# Patient Record
Sex: FEMALE | Race: WHITE | Employment: FULL TIME | URBAN - METROPOLITAN AREA
[De-identification: names, ages, dates, MRNs, and addresses within clinical notes are randomized per-mention and may not be internally consistent; named-entity substitution may affect disease eponyms.]

---

## 2020-07-06 ENCOUNTER — HOSPITAL ENCOUNTER (EMERGENCY)
Age: 25
Discharge: HOME | End: 2020-07-06
Attending: EMERGENCY MEDICINE
Payer: COMMERCIAL

## 2020-07-06 VITALS
OXYGEN SATURATION: 100 % | TEMPERATURE: 97.1 F | BODY MASS INDEX: 2.76 KG/M2 | SYSTOLIC BLOOD PRESSURE: 112 MMHG | HEIGHT: 62 IN | DIASTOLIC BLOOD PRESSURE: 70 MMHG | WEIGHT: 15 LBS | HEART RATE: 75 BPM | RESPIRATION RATE: 16 BRPM

## 2020-07-06 LAB
ANION GAP SERPL CALCULATED.3IONS-SCNC: 8 MMOL/L (ref 3–16)
BASOPHILS ABSOLUTE: 0 K/UL (ref 0–0.2)
BASOPHILS RELATIVE PERCENT: 0.8 %
BUN BLDV-MCNC: 19 MG/DL (ref 7–20)
CALCIUM SERPL-MCNC: 9 MG/DL (ref 8.3–10.6)
CHLORIDE BLD-SCNC: 103 MMOL/L (ref 99–110)
CO2: 26 MMOL/L (ref 21–32)
CREAT SERPL-MCNC: 0.9 MG/DL (ref 0.6–1.1)
EOSINOPHILS ABSOLUTE: 0.1 K/UL (ref 0–0.6)
EOSINOPHILS RELATIVE PERCENT: 1.4 %
GFR AFRICAN AMERICAN: >60
GFR NON-AFRICAN AMERICAN: >60
GLUCOSE BLD-MCNC: 102 MG/DL (ref 70–99)
HCG QUALITATIVE: NEGATIVE
HCT VFR BLD CALC: 38.8 % (ref 36–48)
HEMOGLOBIN: 12.9 G/DL (ref 12–16)
LYMPHOCYTES ABSOLUTE: 2.7 K/UL (ref 1–5.1)
LYMPHOCYTES RELATIVE PERCENT: 43 %
MCH RBC QN AUTO: 30.5 PG (ref 26–34)
MCHC RBC AUTO-ENTMCNC: 33.2 G/DL (ref 31–36)
MCV RBC AUTO: 92 FL (ref 80–100)
MONOCYTES ABSOLUTE: 0.4 K/UL (ref 0–1.3)
MONOCYTES RELATIVE PERCENT: 7 %
NEUTROPHILS ABSOLUTE: 3 K/UL (ref 1.7–7.7)
NEUTROPHILS RELATIVE PERCENT: 47.8 %
PDW BLD-RTO: 14.5 % (ref 12.4–15.4)
PLATELET # BLD: 200 K/UL (ref 135–450)
PMV BLD AUTO: 10 FL (ref 5–10.5)
POTASSIUM REFLEX MAGNESIUM: 3.6 MMOL/L (ref 3.5–5.1)
RBC # BLD: 4.22 M/UL (ref 4–5.2)
SODIUM BLD-SCNC: 137 MMOL/L (ref 136–145)
WBC # BLD: 6.2 K/UL (ref 4–11)

## 2020-07-06 PROCEDURE — 85025 COMPLETE CBC W/AUTO DIFF WBC: CPT

## 2020-07-06 PROCEDURE — 80048 BASIC METABOLIC PNL TOTAL CA: CPT

## 2020-07-06 PROCEDURE — 84703 CHORIONIC GONADOTROPIN ASSAY: CPT

## 2020-07-06 PROCEDURE — 93005 ELECTROCARDIOGRAM TRACING: CPT | Performed by: PHYSICIAN ASSISTANT

## 2020-07-06 PROCEDURE — 99284 EMERGENCY DEPT VISIT MOD MDM: CPT

## 2020-07-06 ASSESSMENT — ENCOUNTER SYMPTOMS
VOMITING: 0
NAUSEA: 0
BACK PAIN: 0
ABDOMINAL PAIN: 0
EYE PAIN: 0
COUGH: 0
DIARRHEA: 0
SHORTNESS OF BREATH: 0
PHOTOPHOBIA: 0

## 2020-07-06 ASSESSMENT — PAIN SCALES - GENERAL: PAINLEVEL_OUTOF10: 3

## 2020-07-06 ASSESSMENT — PAIN DESCRIPTION - PAIN TYPE: TYPE: ACUTE PAIN

## 2020-07-06 ASSESSMENT — PAIN DESCRIPTION - LOCATION: LOCATION: HEAD

## 2020-07-06 NOTE — ED NOTES
Discharge order received. Patient being DC home with family. All paperwork explained to patient. She verbalizes understanding and denies any questions. All belongings sent with patient.       Forrest Rodrigues RN  07/06/20 [id number]

## 2020-07-06 NOTE — ED PROVIDER NOTES
ED Attending Attestation Note     Date of evaluation: 7/6/2020    This patient was seen by the advance practice provider. I have seen and examined the patient, agree with the workup, evaluation, management and diagnosis. The care plan has been discussed. I have reviewed the ECG and concur with the JIM's interpretation. My assessment reveals young adult female in no acute distress, moving all extremities without focal deficit, presents after a likely vasovagal syncopal episode with long prodrome. EKG shows no acute abnormalities or dysrhythmia. Patient is asymptomatic now than a focal amount of pain on her right forehead where she struck her head.        Jennifer Torres MD  07/06/20 1422

## 2020-07-14 LAB
EKG ATRIAL RATE: 75 BPM
EKG DIAGNOSIS: NORMAL
EKG P AXIS: 59 DEGREES
EKG P-R INTERVAL: 178 MS
EKG Q-T INTERVAL: 384 MS
EKG QRS DURATION: 88 MS
EKG QTC CALCULATION (BAZETT): 428 MS
EKG R AXIS: 90 DEGREES
EKG T AXIS: 26 DEGREES
EKG VENTRICULAR RATE: 75 BPM

## 2024-11-22 NOTE — ED PROVIDER NOTES
Social History     She has no past medical history on file. She has a past surgical history that includes Tonsillectomy; hip surgery; and [place] tooth extraction. Her family history is not on file. She reports that she has never smoked. She has never used smokeless tobacco. She reports current alcohol use. She reports previous drug use. Medications     Previous Medications    NONFORMULARY    Birth control       Allergies     She is allergic to azithromycin. Physical Exam     INITIAL VITALS: BP: 97/71, Temp: 97.1 °F (36.2 °C), Pulse: 75, Resp: 16, SpO2: 100 %  Physical Exam  Constitutional:       General: She is not in acute distress. Appearance: She is well-developed. HENT:      Head: Normocephalic and atraumatic. Eyes:      Pupils: Pupils are equal, round, and reactive to light. Neck:      Musculoskeletal: Normal range of motion and neck supple. Cardiovascular:      Rate and Rhythm: Normal rate and regular rhythm. Heart sounds: No murmur. No friction rub. No gallop. Pulmonary:      Effort: Pulmonary effort is normal. No respiratory distress. Breath sounds: Normal breath sounds. Abdominal:      Palpations: Abdomen is soft. Tenderness: There is no abdominal tenderness. Musculoskeletal: Normal range of motion. Skin:     General: Skin is warm and dry. Neurological:      General: No focal deficit present. Mental Status: She is alert and oriented to person, place, and time. Cranial Nerves: No cranial nerve deficit. Sensory: No sensory deficit. Motor: No weakness. Coordination: Coordination normal.      Comments: Normal finger to nose.  No pronator drift         Diagnostic Results     EKG   Interpreted in conjunction with emergency department physician No att. providers found  Rhythm: normal sinus   Rate: normal  Axis: right  Ectopy: none  Conduction: normal  ST Segments: normal  T Waves: inversion in  v1, v2 and v3  Q Waves:nonspecific  Clinical Impression: non-specific EKG  Comparison:  none    RADIOLOGY:  No orders to display       LABS:   Results for orders placed or performed during the hospital encounter of 07/06/20   CBC Auto Differential   Result Value Ref Range    WBC 6.2 4.0 - 11.0 K/uL    RBC 4.22 4.00 - 5.20 M/uL    Hemoglobin 12.9 12.0 - 16.0 g/dL    Hematocrit 38.8 36.0 - 48.0 %    MCV 92.0 80.0 - 100.0 fL    MCH 30.5 26.0 - 34.0 pg    MCHC 33.2 31.0 - 36.0 g/dL    RDW 14.5 12.4 - 15.4 %    Platelets 701 316 - 521 K/uL    MPV 10.0 5.0 - 10.5 fL    Neutrophils % 47.8 %    Lymphocytes % 43.0 %    Monocytes % 7.0 %    Eosinophils % 1.4 %    Basophils % 0.8 %    Neutrophils Absolute 3.0 1.7 - 7.7 K/uL    Lymphocytes Absolute 2.7 1.0 - 5.1 K/uL    Monocytes Absolute 0.4 0.0 - 1.3 K/uL    Eosinophils Absolute 0.1 0.0 - 0.6 K/uL    Basophils Absolute 0.0 0.0 - 0.2 K/uL   Basic Metabolic Panel w/ Reflex to MG   Result Value Ref Range    Sodium 137 136 - 145 mmol/L    Potassium reflex Magnesium 3.6 3.5 - 5.1 mmol/L    Chloride 103 99 - 110 mmol/L    CO2 26 21 - 32 mmol/L    Anion Gap 8 3 - 16    Glucose 102 (H) 70 - 99 mg/dL    BUN 19 7 - 20 mg/dL    CREATININE 0.9 0.6 - 1.1 mg/dL    GFR Non-African American >60 >60    GFR African American >60 >60    Calcium 9.0 8.3 - 10.6 mg/dL   HCG Qualitative, Serum   Result Value Ref Range    hCG Qual Negative Detects HCG level >10 MIU/mL   EKG 12 Lead   Result Value Ref Range    Ventricular Rate 75 BPM    Atrial Rate 75 BPM    P-R Interval 178 ms    QRS Duration 88 ms    Q-T Interval 384 ms    QTc Calculation (Bazett) 428 ms    P Axis 59 degrees    R Axis 90 degrees    T Axis 26 degrees    Diagnosis       EKG performed in ER and to be interpreted by ER physician. Confirmed by MD, ER ([id number]),  Vance Albarado ([facility] ([id number] on 7/6/2020 10:25:14 AM     RECENT VITALS:  BP: 97/71, Temp: 97.1 °F (36.2 °C), Pulse: 75, Resp: 16, SpO2: 100 %     ED Course     Nursing Notes, Past Medical Hx,Past Surgical Hx, Social Hx, Allergies, and Family Hx were reviewed. The patient was given the following medications:  No orders of the defined types were placed in this encounter. CONSULTS:  None    MEDICAL DECISION MAKING / ASSESSMENT / PLAN     Jada Gutierrez is a 21 y.o. female who presents the emergency department loss of consciousness. Vital signs were stable on presentation remained stable throughout her stay. Thorough history and physical exam was performed as detailed above. Patient presents the emergency department due to loss of consciousness while watching a therapy session earlier today. She states she was standing for approximately 15 to 20 minutes. She began to feel mildly lightheaded as if she is going to pass out. She attempted to find a place to sit down, however did end up passing out. She believes she hit the right side of her head during the episode. She endorses 3/10 pain over her right eyebrow, however denies any other pain at this time. Patient has had similar symptoms in the past, most recently 7 years ago. Denies any chest pain, shortness of breath, abdominal pain, nausea, vomiting, or any other concerning symptoms. On physical examination heart rate and rhythm are regular. Lungs clear to auscultation bilaterally. Abdomen soft and nontender. Cranial nerves II through XII intact. Normal finger-to-nose. No pronator drift. 5/5 strength in bilateral upper and lower extremities. No midline tenderness to cervical, thoracic, or lumbar spine. Full range of motion of bilateral shoulders, elbows, wrists, hips, knees, and ankles without pain. No bony tenderness to bilateral upper or lower extremities. EKG showed no ST or T wave changes concerning for ischemia. CBC showed no signs of anemia with a white blood cell count of 12.9. BMP came back unremarkable. Urine pregnancy came back negative. Patient was able to tolerate p.o. water without any difficulty in the emergency department.   She remained asymptomatic throughout her stay. At this time, I have low suspicion for cardiac cause of syncope given lack of chest pain, shortness of breath, or other concerning symptoms. Low suspicion for acute intracranial abnormality given benign neuro examination. I do not believe she needs imaging given negative [place] head CT rules. At this time I believe patient is stable to be discharged with instructions to follow-up as an outpatient. This was thoroughly discussed with the patient she is agreeable. She was given strict return precautions and discharged home. This patient was also evaluated by the attending physician. All care plans were discussed and agreed upon. Clinical Impression     1.  Syncope and collapse        Disposition     PATIENT REFERRED TO:  [facility]  [address]  [facility] [address] [facility]  [phone]    Schedule an appointment as soon as possible for a visit       [facility] Emergency Department  [address] [place] [address]  Go to   If symptoms worsen      DISCHARGE MEDICATIONS:  New Prescriptions    No medications on file       DISPOSITION  discharge        Faizan العراقي, [place]  07/06/20 [id number]